# Patient Record
Sex: FEMALE | Race: WHITE | NOT HISPANIC OR LATINO | URBAN - METROPOLITAN AREA
[De-identification: names, ages, dates, MRNs, and addresses within clinical notes are randomized per-mention and may not be internally consistent; named-entity substitution may affect disease eponyms.]

---

## 2020-12-08 ENCOUNTER — OFFICE VISIT CONVERTED (OUTPATIENT)
Dept: GASTROENTEROLOGY | Facility: CLINIC | Age: 55
End: 2020-12-08
Attending: NURSE PRACTITIONER

## 2021-05-10 NOTE — H&P
"   History and Physical      Patient Name: Nidia Bowling   Patient ID: [id number]   Sex: Female   YOB: 1965        Visit Date: December 8, 2020    Provider: JARAD Lou   Location: [facility]   Location Address: [address] [place] [address], [address]  [place], [place]  [id number]   Location Phone: [phone]          Chief Complaint  · ER Follow Up      History Of Present Illness  The patient is a 55 year old /White female who presents on referral from Sadaf ABRAHAM for a gastroenterology evaluation.      Patient presents today for ER F/U at [facility] for intense right sided abdominal pain. She was treated with antibiotics for colitis and the abdominal pain has since subsided.  Imaging suggested possible IBD.  Last colonoscopy was 30+ years ago. Bowel movements 1-3x daily. Stool varies from a #4-6 on the [facility] stool chart. Fecal urgency present. She feels that stool may have looked \"black\" last week.  Denies any hematochezia or family hx of colon cancer. Appetite and weight stable.     FMH: Niece (brothers daughter)-Crohn's    CT of the abdomen and pelvis 11/11/2020: Mildly distended gallbladder containing multiple gallstones.  No inflammatory change seen around the gallbladder.  No evidence of biliary tract obstruction.  Bowel wall thickening and intense mucosal enhancement involving a long segment of the distal ileum within the right lower quadrant mid lower abdomen.  Findings are favored to be due to IBD.  No evidence of bowel obstruction.    CBC 11/11/2020: WBC 11.06, hemoglobin 13.2, hematocrit 40.7, platelets 441.  CMP 11/11/2020: GFR greater than 60, alkaline phosphatase 201, AST 14, ALT 11, lipase 18, total bilirubin 0.30.       Past Medical History  Hypertension         Past Surgical History  Colonoscopy; Oral Surgery; Tubal ligation         Medication List  aspirin 81 mg oral tablet,delayed release (DR/EC); lisinopril 10 mg oral tablet; " "multivitamin oral; Plenvu 140-9-5.2 gram oral powder in packet, sequential; Vitamin D3 50 mcg (2,000 unit) oral capsule         Allergy List  Methergine       Allergies Reconciled  Family Medical History  *No Known Family History         Social History  Alcohol (Never); Tobacco (Current every day)         Review of Systems  · Constitutional  o Denies  o : chills, fever  · Eyes  o Denies  o : blurred vision, changes in vision  · Cardiovascular  o Denies  o : chest pain, syncope  · Respiratory  o Denies  o : shortness of breath, dry cough  · Gastrointestinal  o Admits  o : See HPI  · Genitourinary  o Denies  o : dysuria, blood in urine  · Integument  o Denies  o : rash, new skin lesions  · Neurologic  o Denies  o : altered mental status, tingling or numbness  · Musculoskeletal  o Denies  o : joint pain, limitation of motion  · Endocrine  o Denies  o : weight gain, weight loss  · Psychiatric  o Denies  o : anxiety, depression      Vitals  Date Time BP Position Site L\R Cuff Size HR RR TEMP (F) WT  HT  BMI kg/m2 BSA m2 O2 Sat FR L/min FiO2 HC       12/08/2020 01:28 /70 Sitting       119lbs 0oz 5'  1.5\" 22.12 1.53             Physical Examination  · Constitutional  o Appearance  o : well developed, well-nourished, in no acute distress  · Eyes  o Vision  o :   § Visual Fields  § : eyes move symmetrical in all directions  o Sclerae  o : anicteric  o Pupils and Irises  o : pupils equal and symmetrical  · Neck  o Inspection/Palpation  o : supple  · Respiratory  o Respiratory Effort  o : breathing unlabored  o Inspection of Chest  o : normal appearance, no retractions  o Auscultation of Lungs  o : clear to auscultation bilaterally  · Cardiovascular  o Heart  o :   § Auscultation of Heart  § : no murmurs, gallops or rubs  · Gastrointestinal  o Abdominal Examination  o : soft, nontender to palpation, with normal active bowel sounds, no appreciable hepatosplenomegaly  o Digital Rectal Exam  o : " deferred  · Lymphatic  o Neck  o : no palpable lymphadenopathy  · Skin and Subcutaneous Tissue  o General Inspection  o : without focal lesions; turgor is normal  · Psychiatric  o General  o : Alert and oriented x3  o Mood and Affect  o : Mood and affect are appropriate to circumstances          Assessment  · Abnormal finding on GI tract imaging     793.4/R93.3      Plan  · Orders  o Fecal Occult Blood Diagnostic (Send to Lab) [facility] ([id number]) - - 12/08/2020  o Consent for Colonoscopy with Possible Biopsy - Possible risks/complications, benefits, and alternatives to surgical or invasive procedure have been explained to patient and/or legal guardian. -Patient has been evaluated and can tolerate anesthesia and/or sedation. Risks, benefits, and alternatives to anesthesia and sedation have been explained to patient and/or legal guardian. ([id number]) - - 12/08/2020  · Medications  o Medications have been Reconciled  o Transition of Care or Provider Policy  · Instructions  o Handouts provided: Pre-procedure instructions including date and time and location of procedure.  o PLAN: Proceed with procedure. Patient understands risks and benefits and is willing to proceed. Understands the risks include, but are not limited to, bleeding and/or perforation.  o Information given on current diagnoses.  o Electronically Identified Patient Education Materials Provided Electronically  · Disposition  o Follow up after procedure            Electronically Signed by: JARAD Lou -Author on December 8, 2020 03:48:10 PM

## 2021-05-14 VITALS
WEIGHT: 119 LBS | BODY MASS INDEX: 22.47 KG/M2 | HEIGHT: 61 IN | SYSTOLIC BLOOD PRESSURE: 109 MMHG | DIASTOLIC BLOOD PRESSURE: 70 MMHG